# Patient Record
Sex: MALE | Race: WHITE | NOT HISPANIC OR LATINO
[De-identification: names, ages, dates, MRNs, and addresses within clinical notes are randomized per-mention and may not be internally consistent; named-entity substitution may affect disease eponyms.]

---

## 2021-05-20 ENCOUNTER — APPOINTMENT (OUTPATIENT)
Dept: UROLOGY | Facility: HOSPITAL | Age: 51
End: 2021-05-20
Payer: COMMERCIAL

## 2021-05-20 VITALS
HEIGHT: 71 IN | HEART RATE: 77 BPM | WEIGHT: 185 LBS | BODY MASS INDEX: 25.9 KG/M2 | OXYGEN SATURATION: 98 % | TEMPERATURE: 98.6 F | SYSTOLIC BLOOD PRESSURE: 130 MMHG | DIASTOLIC BLOOD PRESSURE: 73 MMHG

## 2021-05-20 DIAGNOSIS — N28.89 OTHER SPECIFIED DISORDERS OF KIDNEY AND URETER: ICD-10-CM

## 2021-05-20 PROCEDURE — 99072 ADDL SUPL MATRL&STAF TM PHE: CPT

## 2021-05-20 PROCEDURE — 99204 OFFICE O/P NEW MOD 45 MIN: CPT

## 2021-05-20 NOTE — HISTORY OF PRESENT ILLNESS
[FreeTextEntry1] : CC: renal mass\par \par HPI: \par Incidental renal mass\par Left anterior location\par Solid and enhancing\par No associated symptoms\par \par FAMHX: noncontributory\par SURGHX: bilateral inguinal herniorrhaphy\par SOCIAL:nonsmoker, extremely active, healthy\par ROS: negative 10 system \par MEDHX: none \par

## 2021-05-20 NOTE — PHYSICAL EXAM
[General Appearance - Well Developed] : well developed [General Appearance - Well Nourished] : well nourished [Normal Appearance] : normal appearance [Well Groomed] : well groomed [General Appearance - In No Acute Distress] : no acute distress [Edema] : no peripheral edema [] : no respiratory distress [Respiration, Rhythm And Depth] : normal respiratory rhythm and effort [Exaggerated Use Of Accessory Muscles For Inspiration] : no accessory muscle use [Abdomen Soft] : soft [Abdomen Tenderness] : non-tender [Costovertebral Angle Tenderness] : no ~M costovertebral angle tenderness [Urethral Meatus] : meatus normal [Penis Abnormality] : normal uncircumcised penis [Urinary Bladder Findings] : the bladder was normal on palpation [Scrotum] : the scrotum was normal [Epididymis] : the epididymides were normal [Testes Tenderness] : no tenderness of the testes [Testes Mass (___cm)] : there were no testicular masses [Normal Station and Gait] : the gait and station were normal for the patient's age [No Focal Deficits] : no focal deficits [Oriented To Time, Place, And Person] : oriented to person, place, and time [Affect] : the affect was normal [Mood] : the mood was normal [Not Anxious] : not anxious [No Palpable Adenopathy] : no palpable adenopathy

## 2024-03-18 ENCOUNTER — APPOINTMENT (OUTPATIENT)
Dept: UROLOGY | Facility: CLINIC | Age: 54
End: 2024-03-18
Payer: COMMERCIAL

## 2024-03-18 ENCOUNTER — LABORATORY RESULT (OUTPATIENT)
Age: 54
End: 2024-03-18

## 2024-03-18 VITALS
SYSTOLIC BLOOD PRESSURE: 156 MMHG | TEMPERATURE: 98.6 F | OXYGEN SATURATION: 98 % | DIASTOLIC BLOOD PRESSURE: 86 MMHG | HEIGHT: 71 IN | HEART RATE: 67 BPM | BODY MASS INDEX: 25.2 KG/M2 | WEIGHT: 180 LBS

## 2024-03-18 DIAGNOSIS — R31.29 OTHER MICROSCOPIC HEMATURIA: ICD-10-CM

## 2024-03-18 PROCEDURE — 99204 OFFICE O/P NEW MOD 45 MIN: CPT

## 2024-03-19 ENCOUNTER — NON-APPOINTMENT (OUTPATIENT)
Age: 54
End: 2024-03-19

## 2024-03-19 LAB
ANION GAP SERPL CALC-SCNC: 12 MMOL/L
APPEARANCE: CLEAR
BACTERIA: NEGATIVE /HPF
BILIRUBIN URINE: NEGATIVE
BLOOD URINE: NEGATIVE
BUN SERPL-MCNC: 14 MG/DL
CALCIUM SERPL-MCNC: 9.3 MG/DL
CAST: 0 /LPF
CHLORIDE SERPL-SCNC: 106 MMOL/L
CO2 SERPL-SCNC: 22 MMOL/L
COLOR: YELLOW
CREAT SERPL-MCNC: 0.76 MG/DL
EGFR: 108 ML/MIN/1.73M2
EPITHELIAL CELLS: 0 /HPF
GLUCOSE QUALITATIVE U: NEGATIVE MG/DL
GLUCOSE SERPL-MCNC: 93 MG/DL
KETONES URINE: NEGATIVE MG/DL
LEUKOCYTE ESTERASE URINE: ABNORMAL
MICROSCOPIC-UA: NORMAL
NITRITE URINE: NEGATIVE
PH URINE: 5.5
POTASSIUM SERPL-SCNC: 4.4 MMOL/L
PROTEIN URINE: NEGATIVE MG/DL
RED BLOOD CELLS URINE: 2 /HPF
SODIUM SERPL-SCNC: 139 MMOL/L
SPECIFIC GRAVITY URINE: 1.01
UROBILINOGEN URINE: 0.2 MG/DL
WHITE BLOOD CELLS URINE: 2 /HPF

## 2024-03-27 ENCOUNTER — NON-APPOINTMENT (OUTPATIENT)
Age: 54
End: 2024-03-27

## 2024-03-27 DIAGNOSIS — Z85.528 PERSONAL HISTORY OF OTHER MALIGNANT NEOPLASM OF KIDNEY: ICD-10-CM

## 2024-03-29 ENCOUNTER — RESULT REVIEW (OUTPATIENT)
Age: 54
End: 2024-03-29

## 2024-03-29 ENCOUNTER — OUTPATIENT (OUTPATIENT)
Dept: OUTPATIENT SERVICES | Facility: HOSPITAL | Age: 54
LOS: 1 days | End: 2024-03-29
Payer: COMMERCIAL

## 2024-03-29 ENCOUNTER — APPOINTMENT (OUTPATIENT)
Dept: CT IMAGING | Facility: HOSPITAL | Age: 54
End: 2024-03-29

## 2024-03-29 LAB — POCT ISTAT CREATININE: 0.8 MG/DL — SIGNIFICANT CHANGE UP (ref 0.5–1.3)

## 2024-03-29 PROCEDURE — 71250 CT THORAX DX C-: CPT

## 2024-03-29 PROCEDURE — 82565 ASSAY OF CREATININE: CPT

## 2024-03-29 PROCEDURE — 71250 CT THORAX DX C-: CPT | Mod: 26

## 2024-03-29 PROCEDURE — 74178 CT ABD&PLV WO CNTR FLWD CNTR: CPT | Mod: 26

## 2024-03-29 PROCEDURE — 74178 CT ABD&PLV WO CNTR FLWD CNTR: CPT

## 2024-04-01 ENCOUNTER — TRANSCRIPTION ENCOUNTER (OUTPATIENT)
Age: 54
End: 2024-04-01

## 2024-04-22 ENCOUNTER — APPOINTMENT (OUTPATIENT)
Dept: UROLOGY | Facility: CLINIC | Age: 54
End: 2024-04-22

## 2024-06-21 NOTE — ADDENDUM
[FreeTextEntry1] : 3/18/2024: UA trace LE, 2 WBC, 2 RBC, sCre 0.76, PSA 0.32  Pt alerted labs were normal via D[employer] text  3/29/2024: Chest CT and AP wC 1 cm cyst upper pole right kidney, unchanged. 3 x 2.3 x 2.6 cm mass in the anterior aspect of the left kidney, mostly composed of fat with peripheral soft tissue density and stranding in the perinephric fat at the site of previously seen enhancing renal mass, most consistent with fat necrosis at the surgical site. 3 mm nonobstructive calculus upper pole left kidney. No hydronephrosis in either side. Recommend follow-up in 3-6 month to ensure stability. No evidence of metastatic disease.  Called pt and told him results. Spent a long time explaining. Will have him do LLSA. Will do CTwwoC in 4-6 mos. Pt paid Out of pocket for CT even though we got auth. [facility] [place]   [place] received from [facility] [place] Pt had a pT1a ccRCC G2 lesion 6/14/2024: RBUS Right mid pole cys tsmaller now 1.1cm No hydro or mass; left kidney s/p Partial Nx; no mass Prostate 17.4gm; normal bladder  6/14/2024: Chest Xray normal

## 2024-06-21 NOTE — HISTORY OF PRESENT ILLNESS
[FreeTextEntry1] : Language: English Date of First visit: 2024 Accompanied by: *** Contact info: *** Referring Provider/PCP: Fausto Clements  [phone]      CC/ Problem List:   =============================================================================== FIRST VISIT: The patient has been a private practice patient of Dr. Agustín Ortega Prior paper chart WAS available for this visit. Any Prior paper chart is scanned into the [employer] system. Please see admin   The patient's first visit with [facility] urology was on 2024 when the patient was 53 years old  The pt was first seen by Dr. Ortega in  for increased urinary frequency and also s/p left varicocelectomy. He was diagnosed with prostatitis and possible microhematuria and given Cipro. He was diagnosed with microhematuria on dip and a cystoscopy on 2010 was negative. He was LTFU until  and after that was seen Q6 mos for urinary frequency and microhematuria where he had a sonogram every couple of years per [facility] protocol.  ON 2021 the patient [place] [facility] [place] Path showed a pT1a 2.5cm CCRCC, no LVI, no sarc/rhabdoid features, margins negative, grade 2.    ------------------------------------------------------------------------------------------- INTERVAL VISITS:    The patient's age today 2024  is 53 year old. Please note interval events and changes in PMH, PSH, MEDS and ALLERGIES were reviewed. He is having more urinary frequency. He is otherwise feeling well. He is having his f/u ultrasounds and CXR with Joshua Raymundo. He states he had a PSA with his PCP and it was normal. HE is not bothered enough to want to try meds. He has nocturia 1x/night for the past week. He denies hematuria, dysuria. He denies abnormal weight loss. He denies straining to void, weak stream.   ===============================================================================   PMH: Denies PSH: partial nephrectomy POBH: (if applicable) FH:   ALL: NKDA MEDS: Denies SOC: No smoking, no ETOH, no drugs     ROS: Review of Systems is as per HPI unless otherwise denoted below     =============================================================================== DATA:   LABS:-------------------------------------------------------------------------------------------------------------------   10/28/2010: Urine culture 1-9K Enterococcus     RADS:-------------------------------------------------------------------------------------------------------------------   2010 RBUS Normal kidneys Bladder normal. PVR 39cc. Prostate 20gm  10/30/2013: RBUS Kidneys normal. Small LMP cyst [id number] Bladder normal with PVR 29cc, prostate 28 gm  2015: Kidneys normal with simple RUP cyst; no hydro or stone Prostate 15gm, Bladder PVR 34cc  2018: RBUS Kidneys normal without mass or stone Bladder normal with PVR 13cc, BL jets seen  2021: RBUS solid left renal lesion 3.3cm, small right renal cyst; normal bladder with PVR 16.7cc   2021: Kidneys with solid enhancing mass left mid anterior kidney 3.1x3.1cm   PATHOLOGY/CYTOLOGY:-------------------------------------------------------------------------------------------       VOIDING STUDIES: ----------------------------------------------------------------------------------------------------  3/18/2024: PVR 4cc     STONE STUDIES: (Analysis/LLSA)----------------------------------------------------------------------------------       PROCEDURES: -----------------------------------------------------------------------------------------------         ===============================================================================    PHYSICAL EXAM:  GEN: AAOx3, NAD; Habitus: normal  BARRIERS to CARE: none  PSYCH: Appropriate Behavior, Affect Congruent  HEENT: AT/NC Trachea midline. EOMI.  Lungs: No labored breathing.  NEURO: + Movement, all 4 extremities grossly intact without deficits. No tremors.  SKIN: Warm dry. No visible rashes or ulcers  GAIT: Gait normal, Stability good  =======================================================================================      ASSESSMENT and PLAN   The patient is a 53 year male with a history of the followin. LUTS Low PVR; he is not bothered enough to want to try meds.     2. pT1a CCRCC s/p left partial nephrectomy with negative margins  He has been having sono's with Dr. Raymundo and has some mild left sided pain. We will have him do a CTAP and CT chest.  NCCN Guidelines for Surveillance After Treatment of Renal Cell Carcinoma are as follows: (Version 4.)  Stage I (pT1a, pT1b) 	H&P and labs annually or as indicated 	ABD CT or MRI at baseline or sono within 3-12 mos of surgery then annually x3 years                     or longer if indicated 	Positive margins or high risk features* can trigger more frequent imaging 	CHEST imaging (x-ray or CT) annually for five years then as indicated.                  More rigorous if adverse path features/+ kareem  3. r/o microhematuria UA micro  -----------------------------------------------------------------------------------------------------   LABS/TESTS Ordered: CTU and CT Chest , BMP, PSA, UA micro   Meds Ordered:   Follow up: VV after CT's   -----------------------------------------------------------------------------------------------------    The total amount of time I have personally spent preparing for this visit, reviewing the patient's test results, obtaining external history, ordering tests/medications, documenting clinical information, communicating with and counseling the patient/family and/or caregiver(s), and spent face to face with the patient explaining the above was 45 minutes.  Thank you for allowing me to participate in your patient's care. Please feel free to contact me with any questions.   Lilliana Crawley MD [facility] Department of Urology   [address] [place] [id number] P: [phone]; F: [phone]

## 2024-07-22 ENCOUNTER — APPOINTMENT (OUTPATIENT)
Dept: UROLOGY | Facility: CLINIC | Age: 54
End: 2024-07-22
Payer: COMMERCIAL

## 2024-07-22 ENCOUNTER — LABORATORY RESULT (OUTPATIENT)
Age: 54
End: 2024-07-22

## 2024-07-22 DIAGNOSIS — R82.994 HYPERCALCIURIA: ICD-10-CM

## 2024-07-22 DIAGNOSIS — Z87.442 PERSONAL HISTORY OF URINARY CALCULI: ICD-10-CM

## 2024-07-22 PROCEDURE — 99214 OFFICE O/P EST MOD 30 MIN: CPT

## 2024-07-23 LAB
ALBUMIN SERPL ELPH-MCNC: 4.5 G/DL
ALP BLD-CCNC: 96 U/L
ALT SERPL-CCNC: 17 U/L
ANION GAP SERPL CALC-SCNC: 12 MMOL/L
AST SERPL-CCNC: 14 U/L
BILIRUB SERPL-MCNC: 0.3 MG/DL
BUN SERPL-MCNC: 21 MG/DL
CALCIUM SERPL-MCNC: 10.2 MG/DL
CALCIUM SERPL-MCNC: 10.2 MG/DL
CHLORIDE SERPL-SCNC: 106 MMOL/L
CO2 SERPL-SCNC: 23 MMOL/L
CREAT SERPL-MCNC: 0.86 MG/DL
EGFR: 104 ML/MIN/1.73M2
GLUCOSE SERPL-MCNC: 95 MG/DL
PARATHYROID HORMONE INTACT: 30 PG/ML
PHOSPHATE SERPL-MCNC: 3.3 MG/DL
POTASSIUM SERPL-SCNC: 5.1 MMOL/L
PROT SERPL-MCNC: 7.2 G/DL
SODIUM SERPL-SCNC: 141 MMOL/L

## 2024-07-24 LAB
CALCIUM ?TM UR-MCNC: 22.1 MG/DL
CREAT SPEC-SCNC: 147 MG/DL
MICROALBUMIN 24H UR DL<=1MG/L-MCNC: <1.2 MG/DL
MICROALBUMIN/CREAT 24H UR-RTO: NORMAL MG/G

## 2024-07-25 LAB
IGA 24H UR QL IFE: NORMAL
M PROTEIN SPEC IFE-MCNC: NORMAL

## 2024-07-25 NOTE — HISTORY OF PRESENT ILLNESS
[FreeTextEntry1] : Language: English Date of First visit: 2024 Accompanied by: *** Contact info: *** Referring Provider/PCP: Fausto Clements  [phone]      CC/ Problem List:   =============================================================================== FIRST VISIT: The patient has been a private practice patient of Dr. Agustín Ortega Prior paper chart WAS available for this visit. Any Prior paper chart is scanned into the [employer] system. Please see admin   The patient's first visit with [facility] urology was on 2024 when the patient was 53 years old  The pt was first seen by Dr. Ortega in  for increased urinary frequency and also s/p left varicocelectomy. He was diagnosed with prostatitis and possible microhematuria and given Cipro. He was diagnosed with microhematuria on dip and a cystoscopy on 2010 was negative. He was LTFU until  and after that was seen Q6 mos for urinary frequency and microhematuria where he had a sonogram every couple of years per [facility] protocol.  On 2021 the patient [place] [facility] [place]. Path showed a pT1a 2.5cm CCRCC, no LVI, no sarc/rhabdoid features, margins negative, grade 2.    ------------------------------------------------------------------------------------------- INTERVAL VISITS:  Multiple pages of records reviewed.  Pt had a pT1a ccRCC G2 lesion  The patient's age today 2024  is 53 year old. Please note interval events and changes in PMH, PSH, MEDS and ALLERGIES were reviewed. He still has nocturia 0-3x/night. He denies hematuria, dysuria. He is not on any special diets. He denies straining to void. He states his stream is sometimes strong, sometimes weak. He still is not bothered enough to want meds. He started B12, Vit D 1000, MVI 50+.  ===============================================================================   PMH: Denies PSH: partial nephrectomy POBH: (if applicable) FH:   ALL: NKDA MEDS: Vit B12, Vit D, MVI SOC: No smoking, no ETOH, no drugs     ROS: Review of Systems is as per HPI unless otherwise denoted below     =============================================================================== DATA:   LABS:-------------------------------------------------------------------------------------------------------------------   10/28/2010: Urine culture 1-9K Enterococcus 3/18/2024: UA trace LE, 2 WBC, 2 RBC, sCre 0.76, PSA 0.32    RADS:------------------------------------------------------------------------------------------------------------------- 2010 RBUS Normal kidneys Bladder normal. PVR 39cc. Prostate 20gm  10/30/2013: RBUS Kidneys normal. Small LMP cyst [id number] Bladder normal with PVR 29cc, prostate 28 gm  2015: Kidneys normal with simple RUP cyst; no hydro or stone Prostate 15gm, Bladder PVR 34cc  2018: RBUS Kidneys normal without mass or stone Bladder normal with PVR 13cc, BL jets seen  2021: RBUS solid left renal lesion 3.3cm, small right renal cyst; normal bladder with PVR 16.7cc  2021: CT Kidneys with solid enhancing mass left mid anterior kidney 3.1x3.1cm  3/29/2024: Chest CT and AP w C 1 cm cyst upper pole right kidney, unchanged. 3 x 2.3 x 2.6 cm mass in the anterior aspect of the left kidney, mostly composed of fat with peripheral soft tissue density and stranding in the perinephric fat at the site of previously seen enhancing renal mass, most consistent with fat necrosis at the surgical site. 3 mm nonobstructive calculus upper pole left kidney. No hydronephrosis in either side. Recommend follow-up in 3-6 month to ensure stability. No evidence of metastatic disease.  2024: RBUS Right mid pole cyst smaller now 1.1cm No hydro or mass; left kidney s/p Partial Nx; no mass Prostate 17.4gm; normal bladder  2024: Chest Xray normal     PATHOLOGY/CYTOLOGY:-------------------------------------------------------------------------------------------       VOIDING STUDIES: ----------------------------------------------------------------------------------------------------  3/18/2024: PVR [facility]     STONE STUDIES: (Analysis/LLSA)----------------------------------------------------------------------------------     LLSA POOR: Calciums very high 684, 381, UA 1.4, 1.3, Sodiums 416, 304, Phos 2.0, 1.8, Protein markers, Cl MOD: Oxalates mildly elevated GOOD: K, Mg, NH4, Volumes, Citrate, pH, SSUA    PROCEDURES: -----------------------------------------------------------------------------------------------         ===============================================================================    PHYSICAL EXAM:  GEN: AAOx3, NAD; Habitus: normal  BARRIERS to CARE: none  PSYCH: Appropriate Behavior, Affect Congruent  HEENT: AT/NC Trachea midline. EOMI.  Lungs: No labored breathing.  NEURO: + Movement, all 4 extremities grossly intact without deficits. No tremors.  SKIN: Warm dry. No visible rashes or ulcers  GAIT: Gait normal, Stability good  =======================================================================================      ASSESSMENT and PLAN   The patient is a 53 year male with a history of the followin. LUTS Low PVR; he is not bothered enough to want to try meds.     2. pT1a G2 CCRCC s/p left partial nephrectomy with negative margins  He has been having sono's with Dr. Raymundo and has some mild left sided pain. His 2024 CT showed a possible mass (way less likely) vs fat necrosis. Radiology recommended another CT. I told him it was highly unlikely that it was anything but did tell him we could do a CT if he wants. He opted to do a CT.  Stage I (pT1a, pT1b) 	H&P and labs annually or as indicated 	ABD CT or MRI at baseline or sono within 3-12 mos of surgery then annually x3 years                     or longer if indicated 	Positive margins or high risk features* can trigger more frequent imaging 	CHEST imaging (x-ray or CT) annually for five years then as indicated.                  More rigorous if adverse path features/+ kareem  3. r/o microhematuria His urine in 2024 only showed 2 RBC/hpf.  5. Renal stone The patient and I reviewed his [employer]/metabolic stone results. His results showed high sodium, high protein markers, high calcium, high uric acid. He reports that he does not eat a lot of high sodium foods and only eats small amounts of meat once per day. He takes 1000mg of calcium per day and does not drink milk. Because of this I ordered him for a hypercalciuria workup (though his hypercalciuria may be due to Vit D def or high urine sodium) and f/u with nephrology  - CMP - Phos - CBC with differential - Both 25 and 1,25 OH Vitamin D - Intact PTH - Serum and urine protein immunofixation - UA - Random urine calcium, random urine microalb:Creat ratio   -----------------------------------------------------------------------------------------------------   LABS/TESTS Ordered: Hypercalciuria workup now; CT in 2024   Meds Ordered:   Follow up: VV after CT scan; see nephrology   -----------------------------------------------------------------------------------------------------    The total amount of time I have personally spent preparing for this visit, reviewing the patient's test results, obtaining external history, ordering tests/medications, documenting clinical information, communicating with and counseling the patient/family and/or caregiver(s), and spent face to face with the patient explaining the above was 35 minutes.  Thank you for allowing me to participate in your patient's care. Please feel free to contact me with any questions.   Lilliana Crawley MD [facility] Department of Urology   [address] [place] [id number] P: [phone]; F: [phone]

## 2024-07-25 NOTE — HISTORY OF PRESENT ILLNESS
[FreeTextEntry1] : Language: English Date of First visit: 2024 Accompanied by: *** Contact info: *** Referring Provider/PCP: Fausto Clements  [phone]      CC/ Problem List:   =============================================================================== FIRST VISIT: The patient has been a private practice patient of Dr. Agustín Ortega Prior paper chart WAS available for this visit. Any Prior paper chart is scanned into the [employer] system. Please see admin   The patient's first visit with [facility] urology was on 2024 when the patient was 53 years old  The pt was first seen by Dr. Ortega in  for increased urinary frequency and also s/p left varicocelectomy. He was diagnosed with prostatitis and possible microhematuria and given Cipro. He was diagnosed with microhematuria on dip and a cystoscopy on 2010 was negative. He was LTFU until  and after that was seen Q6 mos for urinary frequency and microhematuria where he had a sonogram every couple of years per [facility] protocol.  On 2021 the patient underwent a left partial nephrectomy for RCC at [facility] by Joshua Raymundo. Path showed a pT1a 2.5cm CCRCC, no LVI, no sarc/rhabdoid features, margins negative, grade 2.    ------------------------------------------------------------------------------------------- INTERVAL VISITS:  Multiple pages of records reviewed.  Pt had a pT1a ccRCC G2 lesion  The patient's age today 2024  is 53 year old. Please note interval events and changes in PMH, PSH, MEDS and ALLERGIES were reviewed. He still has nocturia 0-3x/night. He denies hematuria, dysuria. He is not on any special diets. He denies straining to void. He states his stream is sometimes strong, sometimes weak. He still is not bothered enough to want meds. He started B12, Vit D 1000, MVI 50+.  ===============================================================================   PMH: Denies PSH: partial nephrectomy POBH: (if applicable) FH:   ALL: NKDA MEDS: Vit B12, Vit D, MVI SOC: No smoking, no ETOH, no drugs     ROS: Review of Systems is as per HPI unless otherwise denoted below     =============================================================================== DATA:   LABS:-------------------------------------------------------------------------------------------------------------------   10/28/2010: Urine culture 1-9K Enterococcus 3/18/2024: UA trace LE, 2 WBC, 2 RBC, sCre 0.76, PSA 0.32    RADS:------------------------------------------------------------------------------------------------------------------- 2010 RBUS Normal kidneys Bladder normal. PVR 39cc. Prostate 20gm  10/30/2013: RBUS Kidneys normal. Small LMP cyst [id number] Bladder normal with PVR 29cc, prostate 28 gm  2015: Kidneys normal with simple RUP cyst; no hydro or stone Prostate 15gm, Bladder PVR 34cc  2018: RBUS Kidneys normal without mass or stone Bladder normal with PVR 13cc, BL jets seen  2021: RBUS solid left renal lesion 3.3cm, small right renal cyst; normal bladder with PVR 16.7cc  2021: CT Kidneys with solid enhancing mass left mid anterior kidney 3.1x3.1cm  3/29/2024: Chest CT and AP w C 1 cm cyst upper pole right kidney, unchanged. 3 x 2.3 x 2.6 cm mass in the anterior aspect of the left kidney, mostly composed of fat with peripheral soft tissue density and stranding in the perinephric fat at the site of previously seen enhancing renal mass, most consistent with fat necrosis at the surgical site. 3 mm nonobstructive calculus upper pole left kidney. No hydronephrosis in either side. Recommend follow-up in 3-6 month to ensure stability. No evidence of metastatic disease.  2024: RBUS Right mid pole cyst smaller now 1.1cm No hydro or mass; left kidney s/p Partial Nx; no mass Prostate 17.4gm; normal bladder  2024: Chest Xray normal     PATHOLOGY/CYTOLOGY:-------------------------------------------------------------------------------------------       VOIDING STUDIES: ----------------------------------------------------------------------------------------------------  3/18/2024: PVR [facility]     STONE STUDIES: (Analysis/LLSA)----------------------------------------------------------------------------------     LLSA POOR: Calciums very high 684, 381, UA 1.4, 1.3, Sodiums 416, 304, Phos 2.0, 1.8, Protein markers, Cl MOD: Oxalates mildly elevated GOOD: K, Mg, NH4, Volumes, Citrate, pH, SSUA    PROCEDURES: -----------------------------------------------------------------------------------------------         ===============================================================================    PHYSICAL EXAM:  GEN: AAOx3, NAD; Habitus: normal  BARRIERS to CARE: none  PSYCH: Appropriate Behavior, Affect Congruent  HEENT: AT/NC Trachea midline. EOMI.  Lungs: No labored breathing.  NEURO: + Movement, all 4 extremities grossly intact without deficits. No tremors.  SKIN: Warm dry. No visible rashes or ulcers  GAIT: Gait normal, Stability good  =======================================================================================      ASSESSMENT and PLAN   The patient is a 53 year male with a history of the followin. LUTS Low PVR; he is not bothered enough to want to try meds.     2. pT1a G2 CCRCC s/p left partial nephrectomy with negative margins  He has been having sono's with Dr. Raymundo and has some mild left sided pain. His 2024 CT showed a possible mass (way less likely) vs fat necrosis. Radiology recommended another CT. I told him it was highly unlikely that it was anything but did tell him we could do a CT if he wants. He opted to do a CT.  Stage I (pT1a, pT1b) 	H&P and labs annually or as indicated 	ABD CT or MRI at baseline or sono within 3-12 mos of surgery then annually x3 years                     or longer if indicated 	Positive margins or high risk features* can trigger more frequent imaging 	CHEST imaging (x-ray or CT) annually for five years then as indicated.                  More rigorous if adverse path features/+ kareem  3. r/o microhematuria His urine in 2024 only showed 2 RBC/hpf.  5. Renal stone The patient and I reviewed his Litholink/metabolic stone results. His results showed high sodium, high protein markers, high calcium, high uric acid. He reports that he does not eat a lot of high sodium foods and only eats small amounts of meat once per day. He takes 1000mg of calcium per day and does not drink milk. Because of this I ordered him for a hypercalciuria workup (though his hypercalciuria may be due to Vit D def or high urine sodium) and f/u with nephrology  - CMP - Phos - CBC with differential - Both 25 and 1,25 OH Vitamin D - Intact PTH - Serum and urine protein immunofixation - UA - Random urine calcium, random urine microalb:Creat ratio   -----------------------------------------------------------------------------------------------------   LABS/TESTS Ordered: Hypercalciuria workup now; CT in 2024   Meds Ordered:   Follow up: VV after CT scan; see nephrology   -----------------------------------------------------------------------------------------------------    The total amount of time I have personally spent preparing for this visit, reviewing the patient's test results, obtaining external history, ordering tests/medications, documenting clinical information, communicating with and counseling the patient/family and/or caregiver(s), and spent face to face with the patient explaining the above was 35 minutes.  Thank you for allowing me to participate in your patient's care. Please feel free to contact me with any questions.   Lilliana Crawley MD [facility] Department of Urology   [address] [place] [address] P: [phone]; F: [phone]

## 2024-07-25 NOTE — ADDENDUM
[FreeTextEntry1] : 7/22/2024: Phos 3.3,  CMP normal with sCre 0.86, PTH 30 Random urine- calcium 22.1, protein 13, albumin <1.2/Cr 147/ACR "unable to calculate", Immunofix serum and urine normal

## 2024-07-25 NOTE — ADDENDUM
[FreeTextEntry1] : 7/22/2024: Phos 3.3,  CMP normal with sCre 0.86, PTH 30 Random urine- calcium 22.1, protein 13, albumin <1.2/Cr 147/ACR "unable to calculate", Immunofix serum and urine normal gradual onset

## 2024-07-25 NOTE — HISTORY OF PRESENT ILLNESS
[FreeTextEntry1] : Language: English Date of First visit: 2024 Accompanied by: *** Contact info: *** Referring Provider/PCP: Fausto Clements  [phone]      CC/ Problem List:   =============================================================================== FIRST VISIT: The patient has been a private practice patient of Dr. Agustín Ortega Prior paper chart WAS available for this visit. Any Prior paper chart is scanned into the [employer] system. Please see admin   The patient's first visit with [facility] urology was on 2024 when the patient was 53 years old  The pt was first seen by Dr. Ortega in  for increased urinary frequency and also s/p left varicocelectomy. He was diagnosed with prostatitis and possible microhematuria and given Cipro. He was diagnosed with microhematuria on dip and a cystoscopy on 2010 was negative. He was LTFU until  and after that was seen Q6 mos for urinary frequency and microhematuria where he had a sonogram every couple of years per [facility] protocol.  On 2021 the patient underwent a left partial nephrectomy for RCC at [facility] by Joshua Raymundo. Path showed a pT1a 2.5cm CCRCC, no LVI, no sarc/rhabdoid features, margins negative, grade 2.    ------------------------------------------------------------------------------------------- INTERVAL VISITS:  Multiple pages of records reviewed.  Pt had a pT1a ccRCC G2 lesion  The patient's age today 2024  is 53 year old. Please note interval events and changes in PMH, PSH, MEDS and ALLERGIES were reviewed. He still has nocturia 0-3x/night. He denies hematuria, dysuria. He is not on any special diets. He denies straining to void. He states his stream is sometimes strong, sometimes weak. He still is not bothered enough to want meds. He started B12, Vit D 1000, MVI 50+.  ===============================================================================   PMH: Denies PSH: partial nephrectomy POBH: (if applicable) FH:   ALL: NKDA MEDS: Vit B12, Vit D, MVI SOC: No smoking, no ETOH, no drugs     ROS: Review of Systems is as per HPI unless otherwise denoted below     =============================================================================== DATA:   LABS:-------------------------------------------------------------------------------------------------------------------   10/28/2010: Urine culture 1-9K Enterococcus 3/18/2024: UA trace LE, 2 WBC, 2 RBC, sCre 0.76, PSA 0.32    RADS:------------------------------------------------------------------------------------------------------------------- 2010 RBUS Normal kidneys Bladder normal. PVR 39cc. Prostate 20gm  10/30/2013: RBUS Kidneys normal. Small LMP cyst [id number] Bladder normal with PVR 29cc, prostate 28 gm  2015: Kidneys normal with simple RUP cyst; no hydro or stone Prostate 15gm, Bladder PVR 34cc  2018: RBUS Kidneys normal without mass or stone Bladder normal with PVR 13cc, BL jets seen  2021: RBUS solid left renal lesion 3.3cm, small right renal cyst; normal bladder with PVR 16.7cc  2021: CT Kidneys with solid enhancing mass left mid anterior kidney 3.1x3.1cm  3/29/2024: Chest CT and AP w C 1 cm cyst upper pole right kidney, unchanged. 3 x 2.3 x 2.6 cm mass in the anterior aspect of the left kidney, mostly composed of fat with peripheral soft tissue density and stranding in the perinephric fat at the site of previously seen enhancing renal mass, most consistent with fat necrosis at the surgical site. 3 mm nonobstructive calculus upper pole left kidney. No hydronephrosis in either side. Recommend follow-up in 3-6 month to ensure stability. No evidence of metastatic disease.  2024: RBUS Right mid pole cyst smaller now 1.1cm No hydro or mass; left kidney s/p Partial Nx; no mass Prostate 17.4gm; normal bladder  2024: Chest Xray normal     PATHOLOGY/CYTOLOGY:-------------------------------------------------------------------------------------------       VOIDING STUDIES: ----------------------------------------------------------------------------------------------------  3/18/2024: PVR [facility]     STONE STUDIES: (Analysis/LLSA)----------------------------------------------------------------------------------     LLSA POOR: Calciums very high 684, 381, UA 1.4, 1.3, Sodiums 416, 304, Phos 2.0, 1.8, Protein markers, Cl MOD: Oxalates mildly elevated GOOD: K, Mg, NH4, Volumes, Citrate, pH, SSUA    PROCEDURES: -----------------------------------------------------------------------------------------------         ===============================================================================    PHYSICAL EXAM:  GEN: AAOx3, NAD; Habitus: normal  BARRIERS to CARE: none  PSYCH: Appropriate Behavior, Affect Congruent  HEENT: AT/NC Trachea midline. EOMI.  Lungs: No labored breathing.  NEURO: + Movement, all 4 extremities grossly intact without deficits. No tremors.  SKIN: Warm dry. No visible rashes or ulcers  GAIT: Gait normal, Stability good  =======================================================================================      ASSESSMENT and PLAN   The patient is a 53 year male with a history of the followin. LUTS Low PVR; he is not bothered enough to want to try meds.     2. pT1a G2 CCRCC s/p left partial nephrectomy with negative margins  He has been having sono's with Dr. Raymundo and has some mild left sided pain. His 2024 CT showed a possible mass (way less likely) vs fat necrosis. Radiology recommended another CT. I told him it was highly unlikely that it was anything but did tell him we could do a CT if he wants. He opted to do a CT.  Stage I (pT1a, pT1b) 	H&P and labs annually or as indicated 	ABD CT or MRI at baseline or sono within 3-12 mos of surgery then annually x3 years                     or longer if indicated 	Positive margins or high risk features* can trigger more frequent imaging 	CHEST imaging (x-ray or CT) annually for five years then as indicated.                  More rigorous if adverse path features/+ kareem  3. r/o microhematuria His urine in 2024 only showed 2 RBC/hpf.  5. Renal stone The patient and I reviewed his Litholink/metabolic stone results. His results showed high sodium, high protein markers, high calcium, high uric acid. He reports that he does not eat a lot of high sodium foods and only eats small amounts of meat once per day. He takes 1000mg of calcium per day and does not drink milk. Because of this I ordered him for a hypercalciuria workup (though his hypercalciuria may be due to Vit D def or high urine sodium) and f/u with nephrology  - CMP - Phos - CBC with differential - Both 25 and 1,25 OH Vitamin D - Intact PTH - Serum and urine protein immunofixation - UA - Random urine calcium, random urine microalb:Creat ratio   -----------------------------------------------------------------------------------------------------   LABS/TESTS Ordered: Hypercalciuria workup now; CT in 2024   Meds Ordered:   Follow up: VV after CT scan; see nephrology   -----------------------------------------------------------------------------------------------------    The total amount of time I have personally spent preparing for this visit, reviewing the patient's test results, obtaining external history, ordering tests/medications, documenting clinical information, communicating with and counseling the patient/family and/or caregiver(s), and spent face to face with the patient explaining the above was 35 minutes.  Thank you for allowing me to participate in your patient's care. Please feel free to contact me with any questions.   Lilliana Crawley MD [facility] Department of Urology   [address] [place] [address] P: [phone]; F: [phone]

## 2024-07-25 NOTE — HISTORY OF PRESENT ILLNESS
[FreeTextEntry1] : Language: English Date of First visit: 2024 Accompanied by: *** Contact info: *** Referring Provider/PCP: Fausto Clements  [phone]      CC/ Problem List:   =============================================================================== FIRST VISIT: The patient has been a private practice patient of Dr. Agustín Ortega Prior paper chart WAS available for this visit. Any Prior paper chart is scanned into the [employer] system. Please see admin   The patient's first visit with [facility] urology was on 2024 when the patient was 53 years old  The pt was first seen by Dr. Ortega in  for increased urinary frequency and also s/p left varicocelectomy. He was diagnosed with prostatitis and possible microhematuria and given Cipro. He was diagnosed with microhematuria on dip and a cystoscopy on 2010 was negative. He was LTFU until  and after that was seen Q6 mos for urinary frequency and microhematuria where he had a sonogram every couple of years per [facility] protocol.  On 2021 the patient underwent a left partial nephrectomy for RCC at [facility] [place] by Joshua Raymundo. Path showed a pT1a 2.5cm CCRCC, no LVI, no sarc/rhabdoid features, margins negative, grade 2.    ------------------------------------------------------------------------------------------- INTERVAL VISITS:  Multiple pages of records reviewed.  Pt had a pT1a ccRCC G2 lesion  The patient's age today 2024  is 53 year old. Please note interval events and changes in PMH, PSH, MEDS and ALLERGIES were reviewed. He still has nocturia 0-3x/night. He denies hematuria, dysuria. He is not on any special diets. He denies straining to void. He states his stream is sometimes strong, sometimes weak. He still is not bothered enough to want meds. He started B12, Vit D 1000, MVI 50+.  ===============================================================================   PMH: Denies PSH: partial nephrectomy POBH: (if applicable) FH:   ALL: NKDA MEDS: Vit B12, Vit D, MVI SOC: No smoking, no ETOH, no drugs     ROS: Review of Systems is as per HPI unless otherwise denoted below     =============================================================================== DATA:   LABS:-------------------------------------------------------------------------------------------------------------------   10/28/2010: Urine culture 1-9K Enterococcus 3/18/2024: UA trace LE, 2 WBC, 2 RBC, sCre 0.76, PSA 0.32    RADS:------------------------------------------------------------------------------------------------------------------- 2010 RBUS Normal kidneys Bladder normal. PVR 39cc. Prostate 20gm  10/30/2013: RBUS Kidneys normal. Small LMP cyst [id number] Bladder normal with PVR 29cc, prostate 28 gm  2015: Kidneys normal with simple RUP cyst; no hydro or stone Prostate 15gm, Bladder PVR 34cc  2018: RBUS Kidneys normal without mass or stone Bladder normal with PVR 13cc, BL jets seen  2021: RBUS solid left renal lesion 3.3cm, small right renal cyst; normal bladder with PVR 16.7cc  2021: CT Kidneys with solid enhancing mass left mid anterior kidney 3.1x3.1cm  3/29/2024: Chest CT and AP w C 1 cm cyst upper pole right kidney, unchanged. 3 x 2.3 x 2.6 cm mass in the anterior aspect of the left kidney, mostly composed of fat with peripheral soft tissue density and stranding in the perinephric fat at the site of previously seen enhancing renal mass, most consistent with fat necrosis at the surgical site. 3 mm nonobstructive calculus upper pole left kidney. No hydronephrosis in either side. Recommend follow-up in 3-6 month to ensure stability. No evidence of metastatic disease.  2024: RBUS Right mid pole cyst smaller now 1.1cm No hydro or mass; left kidney s/p Partial Nx; no mass Prostate 17.4gm; normal bladder  2024: Chest Xray normal     PATHOLOGY/CYTOLOGY:-------------------------------------------------------------------------------------------       VOIDING STUDIES: ----------------------------------------------------------------------------------------------------  3/18/2024: PVR [facility]     STONE STUDIES: (Analysis/LLSA)----------------------------------------------------------------------------------     LLSA POOR: Calciums very high 684, 381, UA 1.4, 1.3, Sodiums 416, 304, Phos 2.0, 1.8, Protein markers, Cl MOD: Oxalates mildly elevated GOOD: K, Mg, NH4, Volumes, Citrate, pH, SSUA    PROCEDURES: -----------------------------------------------------------------------------------------------         ===============================================================================    PHYSICAL EXAM:  GEN: AAOx3, NAD; Habitus: normal  BARRIERS to CARE: none  PSYCH: Appropriate Behavior, Affect Congruent  HEENT: AT/NC Trachea midline. EOMI.  Lungs: No labored breathing.  NEURO: + Movement, all 4 extremities grossly intact without deficits. No tremors.  SKIN: Warm dry. No visible rashes or ulcers  GAIT: Gait normal, Stability good  =======================================================================================      ASSESSMENT and PLAN   The patient is a 53 year male with a history of the followin. LUTS Low PVR; he is not bothered enough to want to try meds.     2. pT1a G2 CCRCC s/p left partial nephrectomy with negative margins  He has been having sono's with Dr. Raymundo and has some mild left sided pain. His 2024 CT showed a possible mass (way less likely) vs fat necrosis. Radiology recommended another CT. I told him it was highly unlikely that it was anything but did tell him we could do a CT if he wants. He opted to do a CT.  Stage I (pT1a, pT1b) 	H&P and labs annually or as indicated 	ABD CT or MRI at baseline or sono within 3-12 mos of surgery then annually x3 years                     or longer if indicated 	Positive margins or high risk features* can trigger more frequent imaging 	CHEST imaging (x-ray or CT) annually for five years then as indicated.                  More rigorous if adverse path features/+ kareem  3. r/o microhematuria His urine in 2024 only showed 2 RBC/hpf.  5. Renal stone The patient and I reviewed his Litholink/metabolic stone results. His results showed high sodium, high protein markers, high calcium, high uric acid. He reports that he does not eat a lot of high sodium foods and only eats small amounts of meat once per day. He takes 1000mg of calcium per day and does not drink milk. Because of this I ordered him for a hypercalciuria workup (though his hypercalciuria may be due to Vit D def or high urine sodium) and f/u with nephrology  - CMP - Phos - CBC with differential - Both 25 and 1,25 OH Vitamin D - Intact PTH - Serum and urine protein immunofixation - UA - Random urine calcium, random urine microalb:Creat ratio   -----------------------------------------------------------------------------------------------------   LABS/TESTS Ordered: Hypercalciuria workup now; CT in 2024   Meds Ordered:   Follow up: VV after CT scan; see nephrology   -----------------------------------------------------------------------------------------------------    The total amount of time I have personally spent preparing for this visit, reviewing the patient's test results, obtaining external history, ordering tests/medications, documenting clinical information, communicating with and counseling the patient/family and/or caregiver(s), and spent face to face with the patient explaining the above was 35 minutes.  Thank you for allowing me to participate in your patient's care. Please feel free to contact me with any questions.   Lilliana Crawley MD [facility] Department of Urology   [address] [place] [address] P: [phone]; F: [phone]

## 2024-07-30 ENCOUNTER — TRANSCRIPTION ENCOUNTER (OUTPATIENT)
Age: 54
End: 2024-07-30

## 2024-08-05 ENCOUNTER — APPOINTMENT (OUTPATIENT)
Dept: NEPHROLOGY | Facility: CLINIC | Age: 54
End: 2024-08-05

## 2024-08-05 ENCOUNTER — NON-APPOINTMENT (OUTPATIENT)
Age: 54
End: 2024-08-05

## 2024-08-05 PROCEDURE — 99204 OFFICE O/P NEW MOD 45 MIN: CPT

## 2024-08-05 PROCEDURE — G2211 COMPLEX E/M VISIT ADD ON: CPT

## 2024-08-05 PROCEDURE — 99203 OFFICE O/P NEW LOW 30 MIN: CPT

## 2024-08-05 NOTE — ASSESSMENT
[FreeTextEntry1] : #Renal Stones  #Abnormal 24 hour urine electrolytes - Recent incidental findings of 3mm stone in upper pole of L kidney that lead to 24 hour urine collection. Values as mentioned above.  - High Na, High Ca, High ox, High Uric acid, Low ph. All very concerning. Plan is to stop [place] vitamins and CA +Vitamin D supplements for now.  - High Na can cause high Ca. Educated on Low salt diet but states he is compliant with it. PTH normal. Will need to check Vitamin D levels. SPEP normal as well.  - Reduce intake of high ox foods and when consuming it, consider adding dairy with it. Alteast 3 servings of dairy per day.  - High Uric acid, High Urine sulfate, High Protin carbolic rate suggest high protein intake. Educated to reduce that to 1mg/kg/per day. Will also need to screen for Type II DM.  - If despite all the changes and stopping OTC vitamins, 24 hour urine collection remains abnormal we will have to proceed with genetic testing and add thiazide like diuretics + K citrate.  - Repeat 24 Urine. Patient plans to do on weekend on 23rd Aug - F/u with me on Aug 29th.   #Elevated BP - BP elevated at urology office and today.  - He is not eager on adding any medications. He is to check BP at home for next 7 days and send me to readings. I told him that 80-90% of values should be <120/80.

## 2024-08-05 NOTE — REVIEW OF SYSTEMS
[Fever] : no fever [Chills] : no chills [Feeling Poorly] : not feeling poorly [Heart Rate Is Slow] : the heart rate was not slow [Heart Rate Is Fast] : the heart rate was not fast [Chest Pain] : no chest pain [Shortness Of Breath] : no shortness of breath [Wheezing] : no wheezing [Cough] : no cough [Abdominal Pain] : no abdominal pain [Vomiting] : no vomiting [Constipation] : no constipation [Diarrhea] : no diarrhea [Dysuria] : no dysuria [Incontinence] : no incontinence [Hesitancy] : no urinary hesitancy

## 2024-08-05 NOTE — PHYSICAL EXAM
[General Appearance - Alert] : alert [General Appearance - In No Acute Distress] : in no acute distress [Neck Appearance] : the appearance of the neck was normal [] : no respiratory distress [Respiration, Rhythm And Depth] : normal respiratory rhythm and effort [Exaggerated Use Of Accessory Muscles For Inspiration] : no accessory muscle use [Apical Impulse] : the apical impulse was normal [Heart Rate And Rhythm] : heart rate was normal and rhythm regular [Heart Sounds] : normal S1 and S2 [Heart Sounds Gallop] : no gallops [Edema] : there was no peripheral edema [Bowel Sounds] : normal bowel sounds [Abdomen Soft] : soft [Abdomen Tenderness] : non-tender

## 2024-08-05 NOTE — HISTORY OF PRESENT ILLNESS
[FreeTextEntry1] : Reason for visit: Referred by Dr. Crawley for nephrolithiasis.   HPI 55 y/o M with medical history of RCC s/p L partial nephrectomy (June 2021) recently noted to have 3mm non obstructive stone in in upper pole of L kidney. This lead to 24 hour urine studies that were grossly abnormal and are mentioned below: Urine Volume: 3L Urine Ca 684 Urine Ox 58 SS Uric acid: 1.1 Uric acid 1475 Na 416, K 125, mag 203, Phos 218, Urine ammonia 83,  CR per kg body weight 32 Urine sulfate 416  Patient denies high salt, high protein diet. We reviewed his diet and it seems that he eats balanced diet for the most part. He denies any history of kidney disease in family. Takes 1g of Ca+Vitamin D and andrés over 50 supplement. Additional work up includes PTH 30, SPEP, Immunofixation negative. CMP normal except for K on the higher side of normal. Normal phos and serum Ca.   No history of kidney stones in the past.

## 2024-09-05 ENCOUNTER — APPOINTMENT (OUTPATIENT)
Dept: NEPHROLOGY | Facility: CLINIC | Age: 54
End: 2024-09-05
Payer: COMMERCIAL

## 2024-09-05 VITALS — SYSTOLIC BLOOD PRESSURE: 128 MMHG | DIASTOLIC BLOOD PRESSURE: 82 MMHG

## 2024-09-05 DIAGNOSIS — N20.0 CALCULUS OF KIDNEY: ICD-10-CM

## 2024-09-05 PROCEDURE — G2211 COMPLEX E/M VISIT ADD ON: CPT

## 2024-09-05 PROCEDURE — 99214 OFFICE O/P EST MOD 30 MIN: CPT

## 2024-09-05 NOTE — PHYSICAL EXAM
[General Appearance - Alert] : alert [General Appearance - In No Acute Distress] : in no acute distress [Neck Appearance] : the appearance of the neck was normal [] : no respiratory distress [Respiration, Rhythm And Depth] : normal respiratory rhythm and effort [Exaggerated Use Of Accessory Muscles For Inspiration] : no accessory muscle use [Auscultation Breath Sounds / Voice Sounds] : lungs were clear to auscultation bilaterally [Apical Impulse] : the apical impulse was normal [Heart Rate And Rhythm] : heart rate was normal and rhythm regular [Heart Sounds] : normal S1 and S2 [Heart Sounds Gallop] : no gallops [Edema] : there was no peripheral edema [Bowel Sounds] : normal bowel sounds [Abdomen Soft] : soft [Abdomen Tenderness] : non-tender

## 2024-09-05 NOTE — HISTORY OF PRESENT ILLNESS
[FreeTextEntry1] : Reason for follow up: Nephrolithiasis   08/05/2024: First visit. He has history of RCC s/p L partial nephrectomy in June of 2021. he was noted to have 3mm non obstructive stone in upper pole of L kidney. This led to 24 hour urine test which was grossly abnormal. I told him to stop vitamins he was taking, increase fluid intake, decrease salt and protein intake. We repeated the 24 hour urine studies.   Interval Events -> BP mostly in systolic 130s/80s range.  -> Stopped OTC vitamins and supplements. Eating low salt and cut down on animal protein.  -> Results: Urine volume 2.4, SS Ca Ox 3.2 from 7.6, Ca from 600 to 230, Uric acid from 1.4 -> 0.8, Urine na from 400 -> 127, PCR 2.3 -> 1.5. Urine Sul from 400 -> 66.  -> Overall feeling well. No complaints.

## 2024-09-05 NOTE — ASSESSMENT
[FreeTextEntry1] : #Left kidney Nephrolithiasis #RCC s/p L partial nephrectomy  -> Abnormal 24 hour urine results all resolved on repeat.  -> Doing well with dietary recommendations.  -> Will need yearly imaging to assess stone and 24 hour urine collection.   #Elevated BP -> He sent me BP readings via email. Most of the BP in range of 130s/80s.  -> With life style modification and low salt intake, i am hoping he will come to <120/80 -> Advised him to monitor at home intermittently. If consistently elevated to let us know.   Follow up in 1 year.

## 2024-09-05 NOTE — REVIEW OF SYSTEMS
[Fever] : no fever [Chills] : no chills [Feeling Poorly] : not feeling poorly [Heart Rate Is Slow] : the heart rate was not slow [Heart Rate Is Fast] : the heart rate was not fast [Chest Pain] : no chest pain [Palpitations] : no palpitations [Shortness Of Breath] : no shortness of breath [Wheezing] : no wheezing [Cough] : no cough [SOB on Exertion] : no shortness of breath during exertion [Abdominal Pain] : no abdominal pain [Vomiting] : no vomiting [Constipation] : no constipation [Diarrhea] : no diarrhea [Dysuria] : no dysuria

## 2024-10-05 ENCOUNTER — APPOINTMENT (OUTPATIENT)
Dept: CT IMAGING | Facility: HOSPITAL | Age: 54
End: 2024-10-05
Payer: COMMERCIAL

## 2024-10-05 ENCOUNTER — OUTPATIENT (OUTPATIENT)
Dept: OUTPATIENT SERVICES | Facility: HOSPITAL | Age: 54
LOS: 1 days | End: 2024-10-05
Payer: COMMERCIAL

## 2024-10-05 LAB — POCT ISTAT CREATININE: 0.9 MG/DL — SIGNIFICANT CHANGE UP (ref 0.5–1.3)

## 2024-10-05 PROCEDURE — 74177 CT ABD & PELVIS W/CONTRAST: CPT | Mod: 26

## 2024-10-05 PROCEDURE — 82565 ASSAY OF CREATININE: CPT

## 2024-10-05 PROCEDURE — 74177 CT ABD & PELVIS W/CONTRAST: CPT

## 2024-10-09 ENCOUNTER — APPOINTMENT (OUTPATIENT)
Dept: UROLOGY | Facility: CLINIC | Age: 54
End: 2024-10-09
Payer: COMMERCIAL

## 2024-10-09 PROCEDURE — 99213 OFFICE O/P EST LOW 20 MIN: CPT

## 2025-09-04 ENCOUNTER — APPOINTMENT (OUTPATIENT)
Dept: NEPHROLOGY | Facility: CLINIC | Age: 55
End: 2025-09-04
Payer: COMMERCIAL

## 2025-09-04 VITALS — SYSTOLIC BLOOD PRESSURE: 110 MMHG | DIASTOLIC BLOOD PRESSURE: 77 MMHG

## 2025-09-04 DIAGNOSIS — N28.89 OTHER SPECIFIED DISORDERS OF KIDNEY AND URETER: ICD-10-CM

## 2025-09-04 DIAGNOSIS — Z87.442 PERSONAL HISTORY OF URINARY CALCULI: ICD-10-CM

## 2025-09-04 PROCEDURE — 99212 OFFICE O/P EST SF 10 MIN: CPT
